# Patient Record
Sex: MALE | URBAN - NONMETROPOLITAN AREA
[De-identification: names, ages, dates, MRNs, and addresses within clinical notes are randomized per-mention and may not be internally consistent; named-entity substitution may affect disease eponyms.]

---

## 2024-09-13 ENCOUNTER — APPOINTMENT (RX ONLY)
Age: 44
Setting detail: DERMATOLOGY
End: 2024-09-13

## 2024-09-13 DIAGNOSIS — D17 BENIGN LIPOMATOUS NEOPLASM: ICD-10-CM

## 2024-09-13 DIAGNOSIS — D22 MELANOCYTIC NEVI: ICD-10-CM

## 2024-09-13 DIAGNOSIS — L72.8 OTHER FOLLICULAR CYSTS OF THE SKIN AND SUBCUTANEOUS TISSUE: ICD-10-CM

## 2024-09-13 PROBLEM — D17.1 BENIGN LIPOMATOUS NEOPLASM OF SKIN AND SUBCUTANEOUS TISSUE OF TRUNK: Status: ACTIVE | Noted: 2024-09-13

## 2024-09-13 PROBLEM — D22.9 MELANOCYTIC NEVI, UNSPECIFIED: Status: ACTIVE | Noted: 2024-09-13

## 2024-09-13 PROCEDURE — ? COUNSELING

## 2024-09-13 PROCEDURE — 99203 OFFICE O/P NEW LOW 30 MIN: CPT

## 2024-09-13 PROCEDURE — ? OBSERVATION AND MEASURE

## 2024-09-13 PROCEDURE — ? DEFER

## 2024-09-13 ASSESSMENT — LOCATION ZONE DERM
LOCATION ZONE: TRUNK
LOCATION ZONE: LEG
LOCATION ZONE: NECK

## 2024-09-13 ASSESSMENT — LOCATION SIMPLE DESCRIPTION DERM
LOCATION SIMPLE: POSTERIOR NECK
LOCATION SIMPLE: RIGHT PRETIBIAL REGION
LOCATION SIMPLE: RIGHT UPPER BACK

## 2024-09-13 ASSESSMENT — LOCATION DETAILED DESCRIPTION DERM
LOCATION DETAILED: LEFT INFERIOR POSTERIOR NECK
LOCATION DETAILED: RIGHT MID-UPPER BACK
LOCATION DETAILED: RIGHT MEDIAL UPPER BACK
LOCATION DETAILED: RIGHT PROXIMAL PRETIBIAL REGION

## 2024-09-13 NOTE — PROCEDURE: OBSERVATION
Detail Level: Detailed
Size Of Lesion: 12cm x 10cm
Size Of Lesion: 2cm x 2cm
Instructions (Optional): -Recommended f/u with [facility] for removal

## 2024-09-13 NOTE — PROCEDURE: COUNSELING
Detail Level: Detailed
Patient Specific Counseling (Will Not Stick From Patient To Patient): Pt states the cyst appeared on his right lower leg 8 months ago.
Patient Specific Counseling (Will Not Stick From Patient To Patient): Needs referral to general surgeon ([facility])
Detail Level: Zone

## 2024-09-13 NOTE — PROCEDURE: DEFER
Detail Level: Detailed
Size Of Lesion In Cm (Optional): 1.8
X Size Of Lesion In Cm (Optional): 1.9
Procedure To Be Performed At Next Visit: Excision
Introduction Text (Please End With A Colon): The following procedure was deferred: